# Patient Record
Sex: MALE | URBAN - METROPOLITAN AREA
[De-identification: names, ages, dates, MRNs, and addresses within clinical notes are randomized per-mention and may not be internally consistent; named-entity substitution may affect disease eponyms.]

---

## 2021-02-05 ENCOUNTER — OFFICE VISIT (OUTPATIENT)
Dept: URBAN - METROPOLITAN AREA SURGERY CENTER 16 | Facility: SURGERY CENTER | Age: 51
End: 2021-02-05
Payer: COMMERCIAL

## 2021-02-05 DIAGNOSIS — Z12.11 COLON CANCER SCREENING: ICD-10-CM

## 2021-02-05 PROCEDURE — G8907 PT DOC NO EVENTS ON DISCHARG: HCPCS | Performed by: INTERNAL MEDICINE

## 2021-02-05 PROCEDURE — G0121 COLON CA SCRN NOT HI RSK IND: HCPCS | Performed by: INTERNAL MEDICINE

## 2022-09-17 ENCOUNTER — HOSPITAL ENCOUNTER (EMERGENCY)
Dept: HOSPITAL 5 - ED | Age: 52
Discharge: HOME | End: 2022-09-17
Payer: SELF-PAY

## 2022-09-17 VITALS — SYSTOLIC BLOOD PRESSURE: 127 MMHG | DIASTOLIC BLOOD PRESSURE: 86 MMHG

## 2022-09-17 DIAGNOSIS — Z79.899: ICD-10-CM

## 2022-09-17 DIAGNOSIS — Z72.89: ICD-10-CM

## 2022-09-17 DIAGNOSIS — I10: ICD-10-CM

## 2022-09-17 DIAGNOSIS — L73.9: Primary | ICD-10-CM

## 2022-09-17 PROCEDURE — 99282 EMERGENCY DEPT VISIT SF MDM: CPT

## 2022-09-17 NOTE — EMERGENCY DEPARTMENT REPORT
- General


Chief complaint: Skin/Abscess/Foreign Body


Stated complaint: SWOLLEN NECK GLANDS


Source: patient


Mode of arrival: Ambulatory


Limitations: No Limitations





- History of Present Illness


Initial comments: 


52-year-old male presents to the ED complaining rash noted to his left side of 

his beard area.  He states that after he shaved he noticed a small pimple area 

that has started to increase in size over the last 2 weeks.  Patient has a 

history of hypertension.  States that he is currently out of his


Amlodipine.  Patient denies any fever ,chills nausea /vomiting .  He is denies 

any chest pain or shortness of breath at present.  Patient is alert and oriented

x3.  No acute distress noted.  No ill appearance noted.








MD complaint: rash


Location: face


Severity scale (0 -10): 0


Worsens with: none


Context: none


Associated symptoms: denies other symptoms


Treatments Prior to Arrival: none





- Related Data


                                  Previous Rx's











 Medication  Instructions  Recorded  Last Taken  Type


 


metFORMIN [Glucophage] 500 mg PO BID #60 tablet 09/26/16 Unknown Rx


 


Doxycycline Hyclate 100 mg PO BID 10 Days #20 tab 09/17/22 Unknown Rx


 


amLODIPine 10 mg PO DAILY 30 Days #30 tab 09/17/22 Unknown Rx











                                    Allergies











Allergy/AdvReac Type Severity Reaction Status Date / Time


 


No Known Allergies Allergy   Verified 09/17/22 09:53














Abscess Boil HPI





- HPI


Chief Complaint: Skin/Abscess/Foreign Body


Stated Complaint: SWOLLEN NECK GLANDS


Home Medications: 


                                  Previous Rx's











 Medication  Instructions  Recorded  Last Taken  Type


 


metFORMIN [Glucophage] 500 mg PO BID #60 tablet 09/26/16 Unknown Rx


 


Doxycycline Hyclate 100 mg PO BID 10 Days #20 tab 09/17/22 Unknown Rx


 


amLODIPine 10 mg PO DAILY 30 Days #30 tab 09/17/22 Unknown Rx











Allergies/Adverse Reactions: 


                                    Allergies











Allergy/AdvReac Type Severity Reaction Status Date / Time


 


No Known Allergies Allergy   Verified 09/17/22 09:53














ED Review of Systems


ROS: 


Stated complaint: SWOLLEN NECK GLANDS


Other details as noted in HPI





Constitutional: denies: chills, fever


Eyes: denies: eye pain, eye discharge, vision change


ENT: denies: ear pain, throat pain


Respiratory: denies: cough, shortness of breath, wheezing


Cardiovascular: denies: chest pain, palpitations


Endocrine: no symptoms reported


Gastrointestinal: denies: abdominal pain, nausea, diarrhea


Genitourinary: denies: urgency, dysuria


Musculoskeletal: denies: back pain, joint swelling, arthralgia


Skin: rash.  denies: lesions


Neurological: denies: headache, weakness, paresthesias


Psychiatric: denies: anxiety, depression


Hematological/Lymphatic: denies: easy bleeding, easy bruising





ED Past Medical Hx





- Past Medical History


Hx Hypertension: Yes


Additional medical history: PRE DIABETIC





- Surgical History


Past Surgical History?: No





- Social History


Smoking Status: Never Smoker


Substance Use Type: Alcohol





- Medications


Home Medications: 


                                Home Medications











 Medication  Instructions  Recorded  Confirmed  Last Taken  Type


 


metFORMIN [Glucophage] 500 mg PO BID #60 tablet 09/26/16  Unknown Rx


 


Doxycycline Hyclate 100 mg PO BID 10 Days #20 tab 09/17/22  Unknown Rx


 


amLODIPine 10 mg PO DAILY 30 Days #30 tab 09/17/22  Unknown Rx














ED Physical Exam





- General


Limitations: No Limitations





ED Course





                                   Vital Signs











  09/17/22





  09:55


 


Temperature 98.6 F


 


Pulse Rate 95 H


 


Respiratory 18





Rate 


 


Blood Pressure 161/116


 


O2 Sat by Pulse 95





Oximetry 














ED Medical Decision Making





- Medical Decision Making


52-year-old male presents to the ED complaining rash noted to his left side of 

his beard area.  He states that after he shaved he noticed a small pimple area 

that has started to increase in size over the last 2 weeks.  Patient has a 

history of hypertension.  States that he is currently out of his


Amlodipine.  Patient denies any fever ,chills nausea /vomiting .  He is denies 

any chest pain or shortness of breath at present.  Patient is alert and oriented

 x3.  No acute distress noted.  No ill appearance noted.  Physical examination 

patient has rash noted to his left side of beard .  Mildly tender,pustle noted 

to the hair shaft





Rechecked the patient is resting  quietly , comfortable and feeling better. I 

discussed the results of diagnostic study, my clinical impression and the plan 

for further treatment with the patient. Patient agrees with plan and discharge 

at this present time. All question addressed.





I have given the patient instruction regarding a diagnosis ,expectation ,follow-

up and return precaution. I explained to the patient that emergent condition may

 arise and to return to the ED for new worsen and any new persisting condition. 

I have explained the importance of following up with the primary care physician 

or referral physician listed below has instructed. The patient verbalized 

understanding of discharge instruction.














Critical care attestation.: 


If time is entered above; I have spent that time in minutes in the direct care 

of this critically ill patient, excluding procedure time.








ED Disposition


Clinical Impression: 


 Folliculitis





Hypertension


Qualifiers:


 Hypertension type: primary hypertension Qualified Code(s): I10 - Essential 

(primary) hypertension





Disposition: 01 HOME / SELF CARE / HOMELESS


Is pt being admited?: No


Does the pt Need Aspirin: No


Condition: Stable


Instructions:  Folliculitis, Hypertension (ED), Preventing Hypertension


Additional Instructions: 


take medication as prescribed


Return to the ED for any worsening symptom


Prescriptions: 


amLODIPine 10 mg PO DAILY 30 Days #30 tab


Doxycycline Hyclate 100 mg PO BID 10 Days #20 tab


Referrals: 


[facility] [Provider Group] - 3-5 Days


Forms:  Work/School Release Form(ED)


Time of Disposition: 13:42